# Patient Record
Sex: FEMALE | Race: ASIAN | Employment: UNEMPLOYED | URBAN - METROPOLITAN AREA
[De-identification: names, ages, dates, MRNs, and addresses within clinical notes are randomized per-mention and may not be internally consistent; named-entity substitution may affect disease eponyms.]

---

## 2020-11-15 ENCOUNTER — HOSPITAL ENCOUNTER (OUTPATIENT)
Age: 25
Discharge: HOME | End: 2020-11-15
Attending: OBSTETRICS & GYNECOLOGY | Admitting: OBSTETRICS & GYNECOLOGY
Payer: MEDICARE

## 2020-11-15 VITALS — HEIGHT: 64 IN | RESPIRATION RATE: 18 BRPM | BODY MASS INDEX: 24.75 KG/M2 | TEMPERATURE: 98.8 F | WEIGHT: 145 LBS

## 2020-11-15 PROBLEM — Z34.90 TERM PREGNANCY: Status: ACTIVE | Noted: 2020-11-15

## 2020-11-15 LAB — SARS-COV-2, NAAT: DETECTED

## 2020-11-15 PROCEDURE — 99211 OFF/OP EST MAY X REQ PHY/QHP: CPT

## 2020-11-15 PROCEDURE — U0002 COVID-19 LAB TEST NON-CDC: HCPCS

## 2020-11-15 PROCEDURE — 59025 FETAL NON-STRESS TEST: CPT

## 2020-11-15 SDOH — HEALTH STABILITY: MENTAL HEALTH: HOW OFTEN DO YOU HAVE A DRINK CONTAINING ALCOHOL?: NEVER

## 2020-11-16 NOTE — PROCEDURES
FETAL SURVEILLANCE TESTING SUMMARY    INDICATIONS:  Fetal well being    OBJECTIVE RESULTS:  Fetal heart variability: moderate  Fetal Heart Rate decelerations: none  Fetal Heart Rate accelerations: yes  Baseline FHR: 160 per minute  Fetal Non-stress Test: reactive    Fetal surveillance: reassuring

## 2020-11-16 NOTE — FLOWSHEET NOTE
Dr Luz Marina Ruiz notified of CoVID result and cervical exam.  Order received to discharge patient to home and follow up as scheduled.   Patient to quarantine

## 2020-11-16 NOTE — FLOWSHEET NOTE
Discharge instructions reviewed with patient via  phone. Patient instructed to call OB tomorrow to handle how her appointment this week will go and to quarantine until she received further instruction.   Patient verbalizes understanding of all instructions and has no further complaints or questions at this time

## 2020-12-20 ENCOUNTER — HOSPITAL ENCOUNTER (EMERGENCY)
Age: 25
Discharge: HOME | End: 2020-12-20
Payer: MEDICARE

## 2020-12-20 VITALS
WEIGHT: 119.27 LBS | TEMPERATURE: 97.2 F | BODY MASS INDEX: 18.72 KG/M2 | RESPIRATION RATE: 20 BRPM | OXYGEN SATURATION: 100 % | DIASTOLIC BLOOD PRESSURE: 69 MMHG | SYSTOLIC BLOOD PRESSURE: 108 MMHG | HEIGHT: 67 IN | HEART RATE: 80 BPM

## 2020-12-20 PROCEDURE — 99284 EMERGENCY DEPT VISIT MOD MDM: CPT

## 2020-12-20 RX ORDER — NAPROXEN 500 MG/1
500 TABLET ORAL 2 TIMES DAILY PRN
Qty: 30 TABLET | Refills: 0 | Status: SHIPPED | OUTPATIENT
Start: 2020-12-20 | End: 2022-07-07

## 2020-12-20 ASSESSMENT — ENCOUNTER SYMPTOMS
COUGH: 0
VOMITING: 0
NAUSEA: 0
SORE THROAT: 1
RHINORRHEA: 0

## 2020-12-20 ASSESSMENT — PAIN SCALES - GENERAL
PAINLEVEL_OUTOF10: 0
PAINLEVEL_OUTOF10: 0
PAINLEVEL_OUTOF10: 6

## 2020-12-20 ASSESSMENT — PAIN DESCRIPTION - ONSET: ONSET: SUDDEN

## 2020-12-20 ASSESSMENT — PAIN DESCRIPTION - FREQUENCY: FREQUENCY: CONTINUOUS

## 2020-12-20 ASSESSMENT — PAIN DESCRIPTION - ORIENTATION: ORIENTATION: LEFT

## 2020-12-20 ASSESSMENT — PAIN DESCRIPTION - DESCRIPTORS: DESCRIPTORS: SHARP;TENDER

## 2020-12-20 ASSESSMENT — PAIN - FUNCTIONAL ASSESSMENT: PAIN_FUNCTIONAL_ASSESSMENT: PREVENTS OR INTERFERES SOME ACTIVE ACTIVITIES AND ADLS

## 2020-12-20 ASSESSMENT — PAIN DESCRIPTION - LOCATION: LOCATION: THROAT

## 2020-12-20 ASSESSMENT — PAIN DESCRIPTION - PROGRESSION: CLINICAL_PROGRESSION: GRADUALLY WORSENING

## 2020-12-20 ASSESSMENT — PAIN DESCRIPTION - PAIN TYPE: TYPE: ACUTE PAIN

## 2020-12-21 NOTE — ED PROVIDER NOTES
[address] [facility]        Pt Name: Santiago Roque  MRN: [id number]  [place] 1995  Date of evaluation: 2020  Provider: SUKHWINDER Aaron    This patient was not seen and evaluated by the attending physician No att. providers found. CHIEF COMPLAINT       Chief Complaint   Patient presents with    Pharyngitis     started last night occurred when eating; on left side of throat; pt denies choking; no sob; no drooling; pt reports internal throat pain; when presses on outside of neck         HISTORY OF PRESENT ILLNESS  (Location/Symptom, Timing/Onset, Context/Setting, Quality, Duration,Modifying Factors, Severity.)   Santiago Roque is a 30 y.o. female who presents to the emergencydepartment for left-sided neck pain and sore throat that started after eating last night around 8:30 PM.  She had a  spicy dish that was rice and pork. Also had a pickle. She reports this pain has been going on since then. Reports is more on the outside of her neck rather than her throat. Denies prior episodes. Does not take anything for pain. She denies cough, fever, chills, rhinorrhea. She denies nausea or vomiting. She did give birth 1 month ago and reports pregnancy was normal.      Nursing Notes were reviewed and I agree. OF SYSTEMS    (2-9 systems for level 4, 10 or more for level 5)     Review of Systems   Constitutional: Negative for fever. HENT: Positive for sore throat. Negative for rhinorrhea. Respiratory: Negative for cough. Gastrointestinal: Negative for nausea and vomiting. Musculoskeletal: Positive for neck pain. Except as noted above the remainder of the review of systems was reviewed and negative. PAST MEDICAL HISTORY   No past medical history on file.     SURGICAL HISTORY         Procedure Laterality Date     SECTION      CHOLECYSTECTOMY         CURRENT MEDICATIONS       Previous Medications    PRENATAL MEDICATIONS:  New Prescriptions    NAPROXEN (NAPROSYN) 500 MG TABLET    Take 1 tablet by mouth 2 times daily as needed for Pain       (Please note that portions ofthis note were completed with a voice recognition program.  Efforts were made to edit the dictations but occasionally words are mis-transcribed.)    Fifi Henriquez, [address] [place] [address]  12/20/20 1956

## 2022-07-07 ENCOUNTER — OFFICE VISIT (OUTPATIENT)
Dept: PRIMARY CARE CLINIC | Age: 27
End: 2022-07-07
Payer: MEDICARE

## 2022-07-07 VITALS
BODY MASS INDEX: 23.92 KG/M2 | DIASTOLIC BLOOD PRESSURE: 88 MMHG | WEIGHT: 135 LBS | SYSTOLIC BLOOD PRESSURE: 128 MMHG | OXYGEN SATURATION: 98 % | HEIGHT: 63 IN | TEMPERATURE: 97.4 F | HEART RATE: 83 BPM

## 2022-07-07 DIAGNOSIS — Z30.8 ENCOUNTER FOR OTHER CONTRACEPTIVE MANAGEMENT: ICD-10-CM

## 2022-07-07 DIAGNOSIS — Z00.00 ROUTINE GENERAL MEDICAL EXAMINATION AT A HEALTH CARE FACILITY: Primary | ICD-10-CM

## 2022-07-07 DIAGNOSIS — E01.0 THYROMEGALY: ICD-10-CM

## 2022-07-07 PROCEDURE — 99385 PREV VISIT NEW AGE 18-39: CPT | Performed by: FAMILY MEDICINE

## 2022-07-07 RX ORDER — MEDROXYPROGESTERONE ACETATE 150 MG/ML
150 INJECTION, SUSPENSION INTRAMUSCULAR ONCE
Qty: 1 ML | Refills: 3 | Status: SHIPPED | OUTPATIENT
Start: 2022-07-07 | End: 2022-07-14

## 2022-07-07 ASSESSMENT — PATIENT HEALTH QUESTIONNAIRE - PHQ9
SUM OF ALL RESPONSES TO PHQ QUESTIONS 1-9: 0
SUM OF ALL RESPONSES TO PHQ QUESTIONS 1-9: 0
2. FEELING DOWN, DEPRESSED OR HOPELESS: 0
SUM OF ALL RESPONSES TO PHQ QUESTIONS 1-9: 0
SUM OF ALL RESPONSES TO PHQ QUESTIONS 1-9: 0
SUM OF ALL RESPONSES TO PHQ9 QUESTIONS 1 & 2: 0
1. LITTLE INTEREST OR PLEASURE IN DOING THINGS: 0

## 2022-07-07 NOTE — PROGRESS NOTES
Chief Complaint   Patient presents with    Annual Exam     Pt states that she used to be on thyroid medication and now she feels like her neck is getting bigger    Dizziness     c/o dizziness x2-3 months     Subjective:   Maggy Adhikari is a 32 y.o. female here as a new patient and is here for a comprehensive physical exam.  Her family moved from [facility] in . Patient had history of enlarged thyroid and was placed on levothyroxine 50 mcg in  and since the move in  has been out of the medication. Patient noticed that her thyroid is getting bigger but denies any dysphagia, dysphonia or odynophagia. No heat or cold intolerance. Also complaining of dizziness off and on for the last 2 to 3 months usually when she gets up quickly especially from lying position. Denies any chest pains or shortness of breath. No palpitations or syncopal episode. 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 Patient is also interested to get Depo-Provera injection. Last injection was after her pregnancy in 2020.  History:  LMP: Patient's last menstrual period was 2022. Last pap date: in   Abnormal pap? no  : 3 (G1 , G2/G3 ).  G3 2020, (+) COVID test  but asymptomatic  Para: 3     Past Medical History:   Diagnosis Date    Blood type A+ 2020    COVID-19 virus infection 11/15/2020    asymptomatic    Goiter diffuse 2016     Past Surgical History:   Procedure Laterality Date     SECTION  2015    in [address] [place] [address]  2017     Family History   Problem Relation Age of Onset    High Blood Pressure Mother     High Blood Pressure Father      Social History     Tobacco Use    Smoking status: Never Smoker    Smokeless tobacco: Never Used   Vaping Use    Vaping Use: Never used   Substance Use Topics    Alcohol use: Never    Drug use: Never     Current Outpatient Medications   Medication Sig Dispense Refill    medroxyPROGESTERone Stressed the importance of regular exercise. --Sexuality: Discussed sexually transmitted diseases, partner selection, use of condoms, avoidance of unintended pregnancy  and contraceptive alternatives. --Injury prevention: Discussed safety belts, safety helmets, smoke detector, smoking near bedding or upholstery. --Dental health: Discussed importance of regular tooth brushing, flossing, and dental visits. --Immunizations reviewed. Encouraged to get COVID-vaccine and she will inquire about her varicella vaccine from her mother. --Discussed benefits of screening colonoscopy. NA  -- Discussed healthy habits and regular exercise. Drawing centers provided for the fasting blood test and will call with test results. - CBC with Auto Differential; Future  - Comprehensive Metabolic Panel; Future  - Lipid Panel; Future  - Hepatitis C Antibody; Future    2. Thyromegaly  Check thyroid level to assess function. Sent for thyroid ultrasound due to increase in size and refer to ENT for evaluation.  - TSH; Future  - T4, Free; Future  - US THYROID; Future  - Divine Auguste MD, Otolaryngology, [facility]    3. Encounter for other contraceptive management  Prescription given for Depo-Provera and best give within 3 to 5 days of her menstrual cycle. - medroxyPROGESTERone (DEPO-PROVERA) 150 MG/ML injection; Inject 1 mL into the muscle once for 1 dose  Dispense: 1 mL; Refill: 3    4. Intermittent dizziness  Advised patient to eat on time and increase water intake up to 85 ounces a day. Continue to monitor symptoms. Call for worsening symptoms or new symptoms develop like palpitations, chest pain and syncopal episode. Return in about 1 year (around 7/7/2023) for adult well check. Electronically signed by Rosa Chase MD on 7/7/2022 at 10:30 AM     This dictation was generated by voice recognition computer software.   Although all attempts are made to edit the dictation for accuracy, there may be errors in the transcription that are not intended.

## 2022-07-07 NOTE — PATIENT INSTRUCTIONS

## 2022-07-08 DIAGNOSIS — E01.0 THYROMEGALY: ICD-10-CM

## 2022-07-08 DIAGNOSIS — Z00.00 ROUTINE GENERAL MEDICAL EXAMINATION AT A HEALTH CARE FACILITY: ICD-10-CM

## 2022-07-08 LAB
A/G RATIO: 1.3 (ref 1.1–2.2)
ALBUMIN SERPL-MCNC: 4.3 G/DL (ref 3.4–5)
ALP BLD-CCNC: 105 U/L (ref 40–129)
ALT SERPL-CCNC: 67 U/L (ref 10–40)
ANION GAP SERPL CALCULATED.3IONS-SCNC: 14 MMOL/L (ref 3–16)
AST SERPL-CCNC: 42 U/L (ref 15–37)
BASOPHILS ABSOLUTE: 0 K/UL (ref 0–0.2)
BASOPHILS RELATIVE PERCENT: 0.5 %
BILIRUB SERPL-MCNC: 0.5 MG/DL (ref 0–1)
BUN BLDV-MCNC: 10 MG/DL (ref 7–20)
CALCIUM SERPL-MCNC: 9.7 MG/DL (ref 8.3–10.6)
CHLORIDE BLD-SCNC: 102 MMOL/L (ref 99–110)
CHOLESTEROL, TOTAL: 148 MG/DL (ref 0–199)
CO2: 22 MMOL/L (ref 21–32)
CREAT SERPL-MCNC: 0.6 MG/DL (ref 0.6–1.1)
EOSINOPHILS ABSOLUTE: 0.2 K/UL (ref 0–0.6)
EOSINOPHILS RELATIVE PERCENT: 2.8 %
GFR AFRICAN AMERICAN: >60
GFR NON-AFRICAN AMERICAN: >60
GLUCOSE BLD-MCNC: 89 MG/DL (ref 70–99)
HCT VFR BLD CALC: 38.4 % (ref 36–48)
HDLC SERPL-MCNC: 26 MG/DL (ref 40–60)
HEMOGLOBIN: 13 G/DL (ref 12–16)
HEPATITIS C ANTIBODY INTERPRETATION: NORMAL
LDL CHOLESTEROL CALCULATED: ABNORMAL MG/DL
LDL CHOLESTEROL DIRECT: 69 MG/DL
LYMPHOCYTES ABSOLUTE: 1.6 K/UL (ref 1–5.1)
LYMPHOCYTES RELATIVE PERCENT: 29.3 %
MCH RBC QN AUTO: 31.3 PG (ref 26–34)
MCHC RBC AUTO-ENTMCNC: 33.9 G/DL (ref 31–36)
MCV RBC AUTO: 92.3 FL (ref 80–100)
MONOCYTES ABSOLUTE: 0.8 K/UL (ref 0–1.3)
MONOCYTES RELATIVE PERCENT: 15 %
NEUTROPHILS ABSOLUTE: 2.9 K/UL (ref 1.7–7.7)
NEUTROPHILS RELATIVE PERCENT: 52.4 %
PDW BLD-RTO: 13.1 % (ref 12.4–15.4)
PLATELET # BLD: 179 K/UL (ref 135–450)
PMV BLD AUTO: 11.9 FL (ref 5–10.5)
POTASSIUM SERPL-SCNC: 4.5 MMOL/L (ref 3.5–5.1)
RBC # BLD: 4.16 M/UL (ref 4–5.2)
SODIUM BLD-SCNC: 138 MMOL/L (ref 136–145)
T4 FREE: 0.6 NG/DL (ref 0.9–1.8)
TOTAL PROTEIN: 7.6 G/DL (ref 6.4–8.2)
TRIGL SERPL-MCNC: 424 MG/DL (ref 0–150)
TSH SERPL DL<=0.05 MIU/L-ACNC: 60.81 UIU/ML (ref 0.27–4.2)
VLDLC SERPL CALC-MCNC: ABNORMAL MG/DL
WBC # BLD: 5.5 K/UL (ref 4–11)

## 2022-07-12 DIAGNOSIS — E03.9 ADULT HYPOTHYROIDISM: Primary | ICD-10-CM

## 2022-07-12 RX ORDER — LEVOTHYROXINE SODIUM 0.07 MG/1
75 TABLET ORAL DAILY
Qty: 90 TABLET | Refills: 1 | Status: SHIPPED | OUTPATIENT
Start: 2022-07-12

## 2022-07-14 ENCOUNTER — NURSE ONLY (OUTPATIENT)
Dept: PRIMARY CARE CLINIC | Age: 27
End: 2022-07-14
Payer: MEDICARE

## 2022-07-14 DIAGNOSIS — Z30.013 ENCOUNTER FOR INITIAL PRESCRIPTION OF INJECTABLE CONTRACEPTIVE: ICD-10-CM

## 2022-07-14 DIAGNOSIS — Z30.09 FAMILY PLANNING: Primary | ICD-10-CM

## 2022-07-14 LAB
CONTROL: NORMAL
PREGNANCY TEST URINE, POC: NEGATIVE

## 2022-07-14 PROCEDURE — 81025 URINE PREGNANCY TEST: CPT | Performed by: FAMILY MEDICINE

## 2022-07-14 PROCEDURE — 96372 THER/PROPH/DIAG INJ SC/IM: CPT | Performed by: FAMILY MEDICINE

## 2022-07-14 RX ORDER — MEDROXYPROGESTERONE ACETATE 150 MG/ML
150 INJECTION, SUSPENSION INTRAMUSCULAR ONCE
Status: COMPLETED | OUTPATIENT
Start: 2022-07-14 | End: 2022-07-14

## 2022-07-14 RX ADMIN — MEDROXYPROGESTERONE ACETATE 150 MG: 150 INJECTION, SUSPENSION INTRAMUSCULAR at 12:57

## 2022-07-18 ENCOUNTER — HOSPITAL ENCOUNTER (OUTPATIENT)
Dept: ULTRASOUND IMAGING | Age: 27
Discharge: HOME | End: 2022-07-18
Payer: MEDICARE

## 2022-07-18 DIAGNOSIS — E01.0 THYROMEGALY: ICD-10-CM

## 2022-07-18 PROCEDURE — 76536 US EXAM OF HEAD AND NECK: CPT

## 2022-07-27 ENCOUNTER — OFFICE VISIT (OUTPATIENT)
Dept: ENT CLINIC | Age: 27
End: 2022-07-27
Payer: MEDICARE

## 2022-07-27 VITALS
HEART RATE: 69 BPM | TEMPERATURE: 98.2 F | BODY MASS INDEX: 23.63 KG/M2 | DIASTOLIC BLOOD PRESSURE: 80 MMHG | WEIGHT: 133 LBS | SYSTOLIC BLOOD PRESSURE: 119 MMHG

## 2022-07-27 DIAGNOSIS — E01.0 THYROMEGALY: Primary | ICD-10-CM

## 2022-07-27 DIAGNOSIS — E06.9 THYROIDITIS: ICD-10-CM

## 2022-07-27 PROCEDURE — G8427 DOCREV CUR MEDS BY ELIG CLIN: HCPCS | Performed by: OTOLARYNGOLOGY

## 2022-07-27 PROCEDURE — 1036F TOBACCO NON-USER: CPT | Performed by: OTOLARYNGOLOGY

## 2022-07-27 PROCEDURE — 99203 OFFICE O/P NEW LOW 30 MIN: CPT | Performed by: OTOLARYNGOLOGY

## 2022-07-27 PROCEDURE — G8420 CALC BMI NORM PARAMETERS: HCPCS | Performed by: OTOLARYNGOLOGY

## 2022-07-27 ASSESSMENT — ENCOUNTER SYMPTOMS
TROUBLE SWALLOWING: 1
SORE THROAT: 0
RHINORRHEA: 0
SINUS PAIN: 0
VOICE CHANGE: 0

## 2022-07-27 NOTE — PROGRESS NOTES
[facility]       NEW PATIENT VISIT      PCP:  Alanna Esposito MD      REFERRED BY:   Dr. Na Olmos  Chief Complaint   Patient presents with    Thyroid Problem     Thyroid enlarged. Neck feels bigger. Food feels stuck when swallowing sometimes       HISTORY OF PRESENT ILLNESS       Leonarda Favre is a 32 y.o. female who presented today for evaluation and management for thyromegaly. Enlarged thyroid gland first noticed in February when necklace no longer fit. Neck is bigger. Has frequent difficulty swallowing,  Denied dyspnea. Denied hoarseness of her voice. No treatment. Was taking synthroid for two years in [facility]. Moved here  and had not used synthroid since moved here, until started synthroid 75 mg daily on 2022. REVIEW OF SYSTEMS   Review of Systems   Constitutional:  Negative for chills, fever and unexpected weight change. HENT:  Positive for trouble swallowing. Negative for congestion, ear discharge, ear pain, hearing loss, rhinorrhea, sinus pain, sore throat, tinnitus and voice change. PAST MEDICAL HISTORY    Past Medical History:   Diagnosis Date    Blood type A+ 2020    COVID-19 virus infection 11/15/2020    asymptomatic    Goiter diffuse 2016    Hypothyroidism 2022    TSH 60.81         Past Surgical History:   Procedure Laterality Date     SECTION  2015    in [address] [place] [address]           EXAMINATION    Vitals:    22 1524   BP: 119/80   Site: Left Upper Arm   Position: Sitting   Cuff Size: Medium Adult   Pulse: 69   Temp: 98.2 °F (36.8 °C)   TempSrc: Temporal   Weight: 133 lb (60.3 kg)     General:  WDWN, NAD, alert and oriented  Face: There was no swelling or lesions detected. Voice: Normal with no hoarseness or hot potato voice.     Ears:  TMs and EACs appeared to be normal.       Nose: The nasal septum, turbinates, secretions, and mucosa appeared to be normal.   Sinuses:  Maxillary and frontal sinuses were nontender to palpation and percussion. Oral cavity:  Mucosa, secretions, tongue, and gingiva appeared to be normal.   Oropharynx:  The palatine tonsils, hard and soft palates, uvula, tongue, posterior oropharyngeal wall, mucosa and secretions appeared to be normal.     Salivary Glands:  Normal bilateral parotid and bilateral submandibular salivary glands. Neck:  No masses or tenderness. Trachea midline. Laryngeal cartilages and hyoid bone normal.  Normal laryngeal creptus. Thyroid:  Bilateral goiter. Nontender, no goiter or nodules palpable. Lymph nodes:  No cervical lymphadenopathy. REVIEW OF IMAGING           Narrative   EXAMINATION:   THYROID ULTRASOUND       7/18/2022       COMPARISON:   None. HISTORY:   ORDERING SYSTEM PROVIDED HISTORY: Thyromegaly   TECHNOLOGIST PROVIDED HISTORY:   This procedure can be scheduled via [employer]. Access your [employer] account by   visiting [facility]. Reason for exam:->thyromegaly       FINDINGS:   Right thyroid lobe:  Measures 5.5 x 2.0 x 1.5 cm       Left thyroid lobe:  Measures 4.2 x 2.4 x 1.5 cm       Isthmus:  7 mm       Thyroid Gland: The thyroid parenchyma is heterogeneous in echotexture and   demonstrates mild hyperemia. This may be secondary to an underlying   thyroiditis. Nodules: No discrete measurable thyroid nodule or mass is identified. Cervical lymphadenopathy: No abnormal lymph nodes in the imaged portions of   the neck. Impression   1. Heterogeneous hyperemic appearance of the thyroid parenchyma, which may   suggest an underlying thyroiditis. Clinical correlation is advised   2. No discrete thyroid nodule or mass. Beau Unger / Dawn Kanner / Yogi Abreu was seen today for thyroid problem.     Diagnoses and all orders for this visit:    Thyromegaly  -     T4, Free; Future  -     TSH;

## 2022-08-08 DIAGNOSIS — E01.0 THYROMEGALY: ICD-10-CM

## 2022-08-08 DIAGNOSIS — E06.9 THYROIDITIS: ICD-10-CM

## 2022-08-08 LAB
T4 FREE: 1.8 NG/DL (ref 0.9–1.8)
TSH SERPL DL<=0.05 MIU/L-ACNC: 1.12 UIU/ML (ref 0.27–4.2)

## 2022-08-24 ENCOUNTER — OFFICE VISIT (OUTPATIENT)
Dept: ENT CLINIC | Age: 27
End: 2022-08-24
Payer: MEDICARE

## 2022-08-24 VITALS — SYSTOLIC BLOOD PRESSURE: 127 MMHG | HEART RATE: 73 BPM | DIASTOLIC BLOOD PRESSURE: 84 MMHG | TEMPERATURE: 97.1 F

## 2022-08-24 DIAGNOSIS — E06.9 THYROIDITIS: ICD-10-CM

## 2022-08-24 DIAGNOSIS — E01.0 THYROMEGALY: Primary | Chronic | ICD-10-CM

## 2022-08-24 PROCEDURE — G8420 CALC BMI NORM PARAMETERS: HCPCS | Performed by: OTOLARYNGOLOGY

## 2022-08-24 PROCEDURE — 99212 OFFICE O/P EST SF 10 MIN: CPT | Performed by: OTOLARYNGOLOGY

## 2022-08-24 PROCEDURE — 1036F TOBACCO NON-USER: CPT | Performed by: OTOLARYNGOLOGY

## 2022-08-24 PROCEDURE — G8427 DOCREV CUR MEDS BY ELIG CLIN: HCPCS | Performed by: OTOLARYNGOLOGY

## 2022-08-24 ASSESSMENT — ENCOUNTER SYMPTOMS
RHINORRHEA: 0
SINUS PAIN: 0
SORE THROAT: 0

## 2022-08-24 NOTE — PROGRESS NOTES
[facility]       PCP:  Shawn Duran MD      REFERRED BY:   Shawn Duran MD       CHIEF COMPLAINT  Chief Complaint   Patient presents with    Thyroid Problem     Thyromegaly, sometimes feels like something is stuck in the throat. HISTORY OF PRESENT ILLNESS       Brian Bravo is a 32 y.o. female here for recheck and follow up of thyromegaly and probable thyroiditis. She stated that she feels better. \"I'm still having some difficulty swallowing, but not every day, only sometimes, like once a week. \"  She stated that it will usually last a couple hours and then goes away. \"Food does not get stuck. It used to get stuck but not since I started the thyroid medication. \"  It is off and on. No other current ENT problems. REVIEW OF SYSTEMS   Review of Systems   Constitutional:  Negative for chills and fever. HENT:  Negative for ear discharge, ear pain, rhinorrhea, sinus pain and sore throat. PAST MEDICAL HISTORY    Past Medical History:   Diagnosis Date    Blood type A+ 2020    COVID-19 virus infection 11/15/2020    asymptomatic    Goiter diffuse     Hypothyroidism 2022    TSH 60.81       Past Surgical History:   Procedure Laterality Date     SECTION  2015    in [address] [place] [address]         EXAMINATION    Vitals:    22 0850   BP: 127/84   Site: Right Upper Arm   Position: Sitting   Cuff Size: Medium Adult   Pulse: 73   Temp: 97.1 °F (36.2 °C)   TempSrc: Temporal     General:  WDWN, NAD, alert and oriented  Face: There was no swelling or lesions detected. Voice: Normal with no hoarseness or hot potato voice. Ears: The external ears, mastoids, TMs and EACs appeared to be normal. Nose: The external nose, nasal septum, turbinates, secretions, and mucosa appeared to be normal.   Sinuses:  Maxillary and frontal sinuses were nontender to palpation and percussion. Oral cavity:  Mucosa, secretions, tongue, and gingiva appeared to be normal.   Oropharynx:  The palatine tonsils, hard and soft palates, uvula, tongue, posterior oropharyngeal wall, mucosa and secretions appeared to be normal.     Salivary Glands:  Normal bilateral parotid and bilateral submandibular salivary glands. Neck:  No masses or tenderness. Trachea midline. Laryngeal cartilages and hyoid bone normal.    Thyroid:  No thyromegaly or nodules or masses or tenderness detected. Lymph nodes:  No cervical lymphadenopathy. Component Ref Range & Units 8/8/22 1251 7/8/22 1505   T4 Free 0.9 - 1.8 ng/dL 1.8  0.6 Low            Component Ref Range & Units 8/8/22 1251 7/8/22 1505   TSH 0.27 - 4.20 uIU/mL 1.12  60.81 High           IMPRESSION / DIAGNOSES / Jayme Ee was seen today for thyroid problem. Diagnoses and all orders for this visit:    Thyromegaly    Thyroiditis       RECOMMENDATIONS/PLAN      Return if thyroid enlargement worsens with symptoms or, for any further ENT or sinus problems or symptoms.

## 2022-10-12 ENCOUNTER — NURSE ONLY (OUTPATIENT)
Dept: PRIMARY CARE CLINIC | Age: 27
End: 2022-10-12
Payer: MEDICARE

## 2022-10-12 DIAGNOSIS — Z30.019 ENCOUNTER FOR FEMALE BIRTH CONTROL: Primary | ICD-10-CM

## 2022-10-12 PROCEDURE — 96372 THER/PROPH/DIAG INJ SC/IM: CPT | Performed by: FAMILY MEDICINE

## 2022-10-12 RX ORDER — METHYLPREDNISOLONE ACETATE 40 MG/ML
40 INJECTION, SUSPENSION INTRA-ARTICULAR; INTRALESIONAL; INTRAMUSCULAR; SOFT TISSUE ONCE
Status: DISCONTINUED | OUTPATIENT
Start: 2022-10-12 | End: 2022-10-12

## 2022-10-12 RX ORDER — MEDROXYPROGESTERONE ACETATE 150 MG/ML
150 INJECTION, SUSPENSION INTRAMUSCULAR ONCE
Status: COMPLETED | OUTPATIENT
Start: 2022-10-12 | End: 2022-10-12

## 2022-10-12 RX ADMIN — MEDROXYPROGESTERONE ACETATE 150 MG: 150 INJECTION, SUSPENSION INTRAMUSCULAR at 09:03

## 2022-10-12 NOTE — PROGRESS NOTES
Patient brought in own vial of medication from pharmacy to be injected.  [facility]

## 2023-01-12 ENCOUNTER — NURSE ONLY (OUTPATIENT)
Dept: PRIMARY CARE CLINIC | Age: 28
End: 2023-01-12
Payer: MEDICARE

## 2023-01-12 DIAGNOSIS — E03.9 ADULT HYPOTHYROIDISM: ICD-10-CM

## 2023-01-12 DIAGNOSIS — Z30.019 ENCOUNTER FOR FEMALE BIRTH CONTROL: Primary | ICD-10-CM

## 2023-01-12 LAB
CONTROL: NORMAL
PREGNANCY TEST URINE, POC: NEGATIVE

## 2023-01-12 PROCEDURE — 81025 URINE PREGNANCY TEST: CPT | Performed by: FAMILY MEDICINE

## 2023-01-12 RX ORDER — MEDROXYPROGESTERONE ACETATE 150 MG/ML
150 INJECTION, SUSPENSION INTRAMUSCULAR ONCE
Status: COMPLETED | OUTPATIENT
Start: 2023-01-12 | End: 2023-01-12

## 2023-01-12 RX ORDER — LEVOTHYROXINE SODIUM 0.07 MG/1
TABLET ORAL
Qty: 90 TABLET | Refills: 0 | Status: SHIPPED | OUTPATIENT
Start: 2023-01-12

## 2023-01-12 RX ADMIN — MEDROXYPROGESTERONE ACETATE 150 MG: 150 INJECTION, SUSPENSION INTRAMUSCULAR at 09:22

## 2023-01-12 NOTE — PROGRESS NOTES
Pt here for her depo. She was one day late from due date. A UPT was obtained and it was negative.     Next depo due 3/30/23-4/13/23. Depo calender was given to the pt.

## 2023-01-12 NOTE — TELEPHONE ENCOUNTER
Recent Visits  Date Type Provider Dept   07/07/22 Office Visit Elver Gonzalez MD [facility] [address] recent visits within past 540 days with a meds authorizing provider and meeting all other requirements  Future Appointments  No visits were found meeting these conditions.   Showing future appointments within next 150 days with a meds authorizing provider and meeting all other requirements

## 2023-04-07 ENCOUNTER — OFFICE VISIT (OUTPATIENT)
Dept: PRIMARY CARE CLINIC | Age: 28
End: 2023-04-07

## 2023-04-07 VITALS
BODY MASS INDEX: 23.99 KG/M2 | WEIGHT: 135 LBS | SYSTOLIC BLOOD PRESSURE: 110 MMHG | OXYGEN SATURATION: 99 % | DIASTOLIC BLOOD PRESSURE: 74 MMHG | HEART RATE: 76 BPM

## 2023-04-07 DIAGNOSIS — R42 INTERMITTENT VERTIGO: ICD-10-CM

## 2023-04-07 DIAGNOSIS — E03.9 ADULT HYPOTHYROIDISM: Primary | ICD-10-CM

## 2023-04-07 DIAGNOSIS — E03.9 ADULT HYPOTHYROIDISM: ICD-10-CM

## 2023-04-07 DIAGNOSIS — E01.0 THYROMEGALY: ICD-10-CM

## 2023-04-07 LAB
BASOPHILS # BLD: 0 K/UL (ref 0–0.2)
BASOPHILS NFR BLD: 0.4 %
DEPRECATED RDW RBC AUTO: 13.8 % (ref 12.4–15.4)
EOSINOPHIL # BLD: 0.1 K/UL (ref 0–0.6)
EOSINOPHIL NFR BLD: 2 %
HCT VFR BLD AUTO: 37.6 % (ref 36–48)
HGB BLD-MCNC: 12.8 G/DL (ref 12–16)
LYMPHOCYTES # BLD: 1.2 K/UL (ref 1–5.1)
LYMPHOCYTES NFR BLD: 23.1 %
MCH RBC QN AUTO: 31.6 PG (ref 26–34)
MCHC RBC AUTO-ENTMCNC: 34.1 G/DL (ref 31–36)
MCV RBC AUTO: 92.7 FL (ref 80–100)
MONOCYTES # BLD: 0.7 K/UL (ref 0–1.3)
MONOCYTES NFR BLD: 13.6 %
NEUTROPHILS # BLD: 3.3 K/UL (ref 1.7–7.7)
NEUTROPHILS NFR BLD: 60.9 %
PLATELET # BLD AUTO: 201 K/UL (ref 135–450)
PMV BLD AUTO: 11.9 FL (ref 5–10.5)
RBC # BLD AUTO: 4.06 M/UL (ref 4–5.2)
T4 FREE SERPL-MCNC: 1.4 NG/DL (ref 0.9–1.8)
TSH SERPL DL<=0.005 MIU/L-ACNC: 1.79 UIU/ML (ref 0.27–4.2)
WBC # BLD AUTO: 5.4 K/UL (ref 4–11)

## 2023-04-07 ASSESSMENT — PATIENT HEALTH QUESTIONNAIRE - PHQ9
SUM OF ALL RESPONSES TO PHQ QUESTIONS 1-9: 0
2. FEELING DOWN, DEPRESSED OR HOPELESS: 0
1. LITTLE INTEREST OR PLEASURE IN DOING THINGS: 0
SUM OF ALL RESPONSES TO PHQ QUESTIONS 1-9: 0
SUM OF ALL RESPONSES TO PHQ QUESTIONS 1-9: 0
SUM OF ALL RESPONSES TO PHQ9 QUESTIONS 1 & 2: 0
SUM OF ALL RESPONSES TO PHQ QUESTIONS 1-9: 0

## 2023-04-07 NOTE — PROGRESS NOTES
S2 normal, no murmur, rub or gallop   Extremities: Extremities normal, atraumatic, no cyanosis or edema   Pulses: 2+ and symmetric all extremities   Skin: Skin color, texture, turgor normal, hyperpigmented patches on both lower extremities, lower 3rd  Lymph nodes: Cervical, supraclavicular, and axillary nodes normal   Neurologic: CNII-XII intact, normal strength, sensation and reflexes throughout         Laboratory:  Lab Results   Component Value Date/Time    TSH 1.12 08/08/2022 12:51 PM            Assessment/Plan:   1. Adult hypothyroidism  Currently on levothyroxine 75 mcg daily for replacement therapy. Check level for dose adjustment.  - TSH; Future  - T4, Free; Future  - CBC with Auto Differential; Future    2. Thyromegaly  Stable in size and no further concern. We will continue to monitor. Recommend to follow-up with ENT for worsening symptoms. 3. Intermittent vertigo  Encouraged to do diary to assess the etiology of her intermittent vertigo. Could be due to congestion, hydration or not eating on time. Might need ENT evaluation if persistent and not able to identify the cause. Advised patient to inquire about her varicella immune status. She will try to call her mother in [place] to see if she had the vaccine or had the disease. Return in about 3 months (around 7/7/2023) for adult well check/Pap smear (30 min visit). Electronically Signed: Electronically signed by Rakel Latif MD on 4/7/2023 at 10:59 AM EDT       This dictation was generated by voice recognition computer software. Although all attempts are made to edit the dictation for accuracy, there may be errors in the transcription that are not intended.

## 2023-06-29 ENCOUNTER — NURSE ONLY (OUTPATIENT)
Dept: PRIMARY CARE CLINIC | Age: 28
End: 2023-06-29
Payer: MEDICAID

## 2023-06-29 DIAGNOSIS — Z30.09 FAMILY PLANNING: Primary | ICD-10-CM

## 2023-06-29 PROCEDURE — 96372 THER/PROPH/DIAG INJ SC/IM: CPT | Performed by: FAMILY MEDICINE

## 2023-06-29 RX ORDER — MEDROXYPROGESTERONE ACETATE 150 MG/ML
150 INJECTION, SUSPENSION INTRAMUSCULAR ONCE
Status: COMPLETED | OUTPATIENT
Start: 2023-06-29 | End: 2023-06-29

## 2023-06-29 RX ADMIN — MEDROXYPROGESTERONE ACETATE 150 MG: 150 INJECTION, SUSPENSION INTRAMUSCULAR at 11:13

## 2024-04-12 DIAGNOSIS — E03.9 ADULT HYPOTHYROIDISM: ICD-10-CM

## 2024-04-12 RX ORDER — LEVOTHYROXINE SODIUM 0.07 MG/1
TABLET ORAL
Qty: 30 TABLET | Refills: 0 | Status: SHIPPED | OUTPATIENT
Start: 2024-04-12

## 2024-04-12 NOTE — TELEPHONE ENCOUNTER
Scheduled patient for physical 04/17/2024     Recent Visits  Date Type Provider Dept   04/07/23 Office Visit Reyes, Eleia J, MD [facility]   Showing recent visits within past 540 days with a meds authorizing provider and meeting all other requirements  Future Appointments  No visits were found meeting these conditions.  Showing future appointments within next 150 days with a meds authorizing provider and meeting all other requirements

## 2024-04-17 ENCOUNTER — OFFICE VISIT (OUTPATIENT)
Dept: PRIMARY CARE CLINIC | Age: 29
End: 2024-04-17
Payer: COMMERCIAL

## 2024-04-17 VITALS
SYSTOLIC BLOOD PRESSURE: 122 MMHG | HEIGHT: 63 IN | OXYGEN SATURATION: 98 % | WEIGHT: 137 LBS | DIASTOLIC BLOOD PRESSURE: 68 MMHG | HEART RATE: 86 BPM | BODY MASS INDEX: 24.27 KG/M2

## 2024-04-17 DIAGNOSIS — E03.9 ADULT HYPOTHYROIDISM: ICD-10-CM

## 2024-04-17 DIAGNOSIS — Z00.00 ROUTINE GENERAL MEDICAL EXAMINATION AT A HEALTH CARE FACILITY: Primary | ICD-10-CM

## 2024-04-17 LAB
BASOPHILS # BLD: 0 K/UL (ref 0–0.2)
BASOPHILS NFR BLD: 0.5 %
DEPRECATED RDW RBC AUTO: 12.7 % (ref 12.4–15.4)
EOSINOPHIL # BLD: 0.2 K/UL (ref 0–0.6)
EOSINOPHIL NFR BLD: 3.9 %
HCT VFR BLD AUTO: 36.6 % (ref 36–48)
HGB BLD-MCNC: 12.5 G/DL (ref 12–16)
LYMPHOCYTES # BLD: 1 K/UL (ref 1–5.1)
LYMPHOCYTES NFR BLD: 21.7 %
MCH RBC QN AUTO: 31.3 PG (ref 26–34)
MCHC RBC AUTO-ENTMCNC: 34.2 G/DL (ref 31–36)
MCV RBC AUTO: 91.6 FL (ref 80–100)
MONOCYTES # BLD: 0.6 K/UL (ref 0–1.3)
MONOCYTES NFR BLD: 12.8 %
NEUTROPHILS # BLD: 2.9 K/UL (ref 1.7–7.7)
NEUTROPHILS NFR BLD: 61.1 %
PLATELET # BLD AUTO: 203 K/UL (ref 135–450)
PMV BLD AUTO: 11 FL (ref 5–10.5)
RBC # BLD AUTO: 3.99 M/UL (ref 4–5.2)
WBC # BLD AUTO: 4.7 K/UL (ref 4–11)

## 2024-04-17 PROCEDURE — 99395 PREV VISIT EST AGE 18-39: CPT | Performed by: FAMILY MEDICINE

## 2024-04-17 RX ORDER — LEVOTHYROXINE SODIUM 0.07 MG/1
75 TABLET ORAL DAILY
Qty: 90 TABLET | Refills: 3 | Status: SHIPPED | OUTPATIENT
Start: 2024-04-17

## 2024-04-17 SDOH — ECONOMIC STABILITY: HOUSING INSECURITY
IN THE LAST 12 MONTHS, WAS THERE A TIME WHEN YOU DID NOT HAVE A STEADY PLACE TO SLEEP OR SLEPT IN A SHELTER (INCLUDING NOW)?: NO

## 2024-04-17 SDOH — ECONOMIC STABILITY: FOOD INSECURITY: WITHIN THE PAST 12 MONTHS, YOU WORRIED THAT YOUR FOOD WOULD RUN OUT BEFORE YOU GOT MONEY TO BUY MORE.: NEVER TRUE

## 2024-04-17 SDOH — ECONOMIC STABILITY: FOOD INSECURITY: WITHIN THE PAST 12 MONTHS, THE FOOD YOU BOUGHT JUST DIDN'T LAST AND YOU DIDN'T HAVE MONEY TO GET MORE.: NEVER TRUE

## 2024-04-17 SDOH — ECONOMIC STABILITY: INCOME INSECURITY: HOW HARD IS IT FOR YOU TO PAY FOR THE VERY BASICS LIKE FOOD, HOUSING, MEDICAL CARE, AND HEATING?: NOT HARD AT ALL

## 2024-04-17 ASSESSMENT — PATIENT HEALTH QUESTIONNAIRE - PHQ9
1. LITTLE INTEREST OR PLEASURE IN DOING THINGS: NOT AT ALL
SUM OF ALL RESPONSES TO PHQ QUESTIONS 1-9: 0
SUM OF ALL RESPONSES TO PHQ9 QUESTIONS 1 & 2: 0
SUM OF ALL RESPONSES TO PHQ QUESTIONS 1-9: 0
2. FEELING DOWN, DEPRESSED OR HOPELESS: NOT AT ALL
SUM OF ALL RESPONSES TO PHQ QUESTIONS 1-9: 0
SUM OF ALL RESPONSES TO PHQ QUESTIONS 1-9: 0

## 2024-04-17 NOTE — PROGRESS NOTES
Chief Complaint   Patient presents with    Annual Exam       Subjective:   Lorna Torres is a 28 y.o. female here for a comprehensive physical exam. She is also due for thyroid f/u.  Currently on levothyroxine 75 mcg daily.  Compliant with current medications.  Denies any chest pains, shortness of breath, abdominal pain, melena or hematochezia. Has off/on back pain lately. 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    Reviewed blood test done on 2023: CBC within limits, TSH 1.79, free T41.4     History:  LMP: Patient's last menstrual period was 2024.  Last pap date: in   Abnormal pap? no  : 3   Para: 3     Past Medical History:   Diagnosis Date    Blood type A+ 2020    COVID-19 virus infection 11/15/2020    asymptomatic    Goiter diffuse 2016    Hypothyroidism 2022    TSH 60.81     Past Surgical History:   Procedure Laterality Date     SECTION  2015    in [place]    CHOLECYSTECTOMY  2017     Family History   Problem Relation Age of Onset    High Blood Pressure Mother     High Blood Pressure Father      Social History     Tobacco Use    Smoking status: Never    Smokeless tobacco: Never   Vaping Use    Vaping Use: Never used   Substance Use Topics    Alcohol use: Never    Drug use: Never     Current Outpatient Medications   Medication Sig Dispense Refill    levothyroxine (SYNTHROID) 75 MCG tablet Take 1 tablet by mouth daily 90 tablet 3     No current facility-administered medications for this visit.      No Known Allergies      Objective:   /68   Pulse 86   Ht 1.595 m (5' 2.8\")   Wt 62.1 kg (137 lb)   LMP 2024   SpO2 98%   BMI 24.42 kg/m²    Physical Exam     General:   alert and appears stated age   Gait:   normal   Skin:   normal   Oral cavity:   lips, mucosa, and tongue normal; teeth and gums normal   Eyes:   sclerae white, pupils equal and reactive, red reflex normal bilaterally   Ears:   normal bilaterally   Neck:   no

## 2024-04-18 LAB
ALBUMIN SERPL-MCNC: 4.1 G/DL (ref 3.4–5)
ALBUMIN/GLOB SERPL: 1.2 {RATIO} (ref 1.1–2.2)
ALP SERPL-CCNC: 123 U/L (ref 40–129)
ALT SERPL-CCNC: 22 U/L (ref 10–40)
ANION GAP SERPL CALCULATED.3IONS-SCNC: 12 MMOL/L (ref 3–16)
AST SERPL-CCNC: 21 U/L (ref 15–37)
BILIRUB SERPL-MCNC: 0.4 MG/DL (ref 0–1)
BUN SERPL-MCNC: 12 MG/DL (ref 7–20)
CALCIUM SERPL-MCNC: 8.4 MG/DL (ref 8.3–10.6)
CHLORIDE SERPL-SCNC: 109 MMOL/L (ref 99–110)
CHOLEST SERPL-MCNC: 120 MG/DL (ref 0–199)
CO2 SERPL-SCNC: 22 MMOL/L (ref 21–32)
CREAT SERPL-MCNC: 0.6 MG/DL (ref 0.6–1.1)
GFR SERPLBLD CREATININE-BSD FMLA CKD-EPI: >90 ML/MIN/{1.73_M2}
GLUCOSE SERPL-MCNC: 101 MG/DL (ref 70–99)
HDLC SERPL-MCNC: 27 MG/DL (ref 40–60)
LDLC SERPL CALC-MCNC: 56 MG/DL
POTASSIUM SERPL-SCNC: 3.8 MMOL/L (ref 3.5–5.1)
PROT SERPL-MCNC: 7.4 G/DL (ref 6.4–8.2)
SODIUM SERPL-SCNC: 143 MMOL/L (ref 136–145)
T4 FREE SERPL-MCNC: 1.1 NG/DL (ref 0.9–1.8)
TRIGL SERPL-MCNC: 185 MG/DL (ref 0–150)
TSH SERPL DL<=0.005 MIU/L-ACNC: 9.57 UIU/ML (ref 0.27–4.2)
VLDLC SERPL CALC-MCNC: 37 MG/DL

## 2024-04-23 DIAGNOSIS — E03.9 ADULT HYPOTHYROIDISM: Primary | ICD-10-CM

## 2024-04-23 RX ORDER — LEVOTHYROXINE SODIUM 88 UG/1
88 TABLET ORAL DAILY
Qty: 90 TABLET | Refills: 3 | Status: SHIPPED | OUTPATIENT
Start: 2024-04-23